# Patient Record
Sex: FEMALE | Race: WHITE | NOT HISPANIC OR LATINO | Employment: OTHER | URBAN - METROPOLITAN AREA
[De-identification: names, ages, dates, MRNs, and addresses within clinical notes are randomized per-mention and may not be internally consistent; named-entity substitution may affect disease eponyms.]

---

## 2017-10-02 ENCOUNTER — CATARACT CONSULT (OUTPATIENT)
Dept: URBAN - METROPOLITAN AREA CLINIC 39 | Facility: CLINIC | Age: 82
End: 2017-10-02

## 2017-10-02 VITALS
SYSTOLIC BLOOD PRESSURE: 122 MMHG | DIASTOLIC BLOOD PRESSURE: 67 MMHG | HEIGHT: 55 IN | RESPIRATION RATE: 15 BRPM | HEART RATE: 71 BPM

## 2017-10-02 DIAGNOSIS — H18.59: ICD-10-CM

## 2017-10-02 DIAGNOSIS — H25.811: ICD-10-CM

## 2017-10-02 DIAGNOSIS — H18.51: ICD-10-CM

## 2017-10-02 DIAGNOSIS — H40.033: ICD-10-CM

## 2017-10-02 DIAGNOSIS — H04.123: ICD-10-CM

## 2017-10-02 DIAGNOSIS — H25.812: ICD-10-CM

## 2017-10-02 PROCEDURE — 76514 ECHO EXAM OF EYE THICKNESS: CPT

## 2017-10-02 PROCEDURE — 92136TC INTERFEROMETRY - TECHNICAL COMPONENT

## 2017-10-02 PROCEDURE — 4040F PNEUMOC VAC/ADMIN/RCVD: CPT

## 2017-10-02 PROCEDURE — G8952 PRE-HTN/HTN, NO F/U, NOT GVN: HCPCS

## 2017-10-02 PROCEDURE — G8427 DOCREV CUR MEDS BY ELIG CLIN: HCPCS

## 2017-10-02 PROCEDURE — 99204 OFFICE O/P NEW MOD 45 MIN: CPT

## 2017-10-02 PROCEDURE — 1036F TOBACCO NON-USER: CPT

## 2017-10-02 PROCEDURE — 92132 CPTRZD OPH DX IMG ANT SGM: CPT

## 2017-10-02 RX ORDER — MOXIFLOXACIN HYDROCHLORIDE 5 MG/ML: 1 SOLUTION/ DROPS OPHTHALMIC

## 2017-10-02 RX ORDER — DUREZOL 0.5 MG/ML: 1 EMULSION OPHTHALMIC TWICE A DAY

## 2017-10-02 ASSESSMENT — VISUAL ACUITY
OS_BAT: 20/400
OD_PAM: 20/25-1
OD_CC: J6
OS_AM: 20/25-1
OD_BAT: 20/200
OD_PH: 20/60
OS_SC: 20/40-1
OD_SC: 20/70
OD_SC: J10
OS_SC: J12
OS_CC: J6

## 2017-10-02 ASSESSMENT — TONOMETRY
OS_IOP_MMHG: 13
OD_IOP_MMHG: 13

## 2017-10-10 ENCOUNTER — SURGERY/PROCEDURE (OUTPATIENT)
Dept: URBAN - METROPOLITAN AREA CLINIC 39 | Facility: CLINIC | Age: 82
End: 2017-10-10

## 2017-10-10 DIAGNOSIS — H25.811: ICD-10-CM

## 2017-10-10 PROCEDURE — 6698454 REMOVE CATARACT;INSERT LENS (SX ONLY)

## 2017-10-11 ENCOUNTER — CATARACT POST-OP 1-DAY (OUTPATIENT)
Age: 82
End: 2017-10-11

## 2017-10-11 DIAGNOSIS — H18.51: ICD-10-CM

## 2017-10-11 DIAGNOSIS — H04.123: ICD-10-CM

## 2017-10-11 DIAGNOSIS — H40.033: ICD-10-CM

## 2017-10-11 DIAGNOSIS — Z98.890: ICD-10-CM

## 2017-10-11 DIAGNOSIS — H18.59: ICD-10-CM

## 2017-10-11 DIAGNOSIS — H25.812: ICD-10-CM

## 2017-10-11 PROCEDURE — G8785 BP SCRN NO PERF AT INTERVAL: HCPCS

## 2017-10-11 PROCEDURE — 4040F PNEUMOC VAC/ADMIN/RCVD: CPT

## 2017-10-11 PROCEDURE — 1036F TOBACCO NON-USER: CPT

## 2017-10-11 PROCEDURE — 99024 POSTOP FOLLOW-UP VISIT: CPT

## 2017-10-11 PROCEDURE — G8427 DOCREV CUR MEDS BY ELIG CLIN: HCPCS

## 2017-10-11 ASSESSMENT — TONOMETRY
OS_IOP_MMHG: 14
OD_IOP_MMHG: 15

## 2017-10-11 ASSESSMENT — VISUAL ACUITY: OS_SC: 20/400

## 2017-10-14 ENCOUNTER — POST-OP CATARACT (OUTPATIENT)
Age: 82
End: 2017-10-14

## 2017-10-14 DIAGNOSIS — Z98.890: ICD-10-CM

## 2017-10-14 PROCEDURE — 99024 POSTOP FOLLOW-UP VISIT: CPT

## 2017-10-14 ASSESSMENT — VISUAL ACUITY
OD_SC: 20/70
OS_SC: 20/70

## 2017-10-14 ASSESSMENT — TONOMETRY
OS_IOP_MMHG: 13
OD_IOP_MMHG: 13

## 2017-10-20 ENCOUNTER — POST-OP CATARACT (OUTPATIENT)
Age: 82
End: 2017-10-20

## 2017-10-20 DIAGNOSIS — Z98.890: ICD-10-CM

## 2017-10-20 PROCEDURE — 99024 POSTOP FOLLOW-UP VISIT: CPT

## 2017-10-20 ASSESSMENT — VISUAL ACUITY
OS_SC: 20/100
OD_SC: 20/70-2

## 2017-10-20 ASSESSMENT — TONOMETRY
OD_IOP_MMHG: 14
OS_IOP_MMHG: 12

## 2017-10-31 ENCOUNTER — SURGERY/PROCEDURE (OUTPATIENT)
Dept: URBAN - METROPOLITAN AREA CLINIC 39 | Facility: CLINIC | Age: 82
End: 2017-10-31

## 2017-10-31 DIAGNOSIS — H25.812: ICD-10-CM

## 2017-10-31 PROCEDURE — 66984 XCAPSL CTRC RMVL W/O ECP: CPT

## 2017-11-01 ENCOUNTER — CATARACT POST-OP 1-DAY (OUTPATIENT)
Age: 82
End: 2017-11-01

## 2017-11-01 DIAGNOSIS — Z96.1: ICD-10-CM

## 2017-11-01 PROCEDURE — 99024 POSTOP FOLLOW-UP VISIT: CPT

## 2017-11-01 ASSESSMENT — VISUAL ACUITY
OS_SC: 20/200
OD_SC: 20/70
OS_PH: 20/50+2
OD_PH: 20/100

## 2017-11-01 ASSESSMENT — TONOMETRY
OD_IOP_MMHG: 10
OS_IOP_MMHG: 12

## 2017-11-15 ENCOUNTER — CATARACT POST-OP 1-DAY (OUTPATIENT)
Age: 82
End: 2017-11-15

## 2017-11-15 DIAGNOSIS — Z96.1: ICD-10-CM

## 2017-11-15 PROCEDURE — 99024 POSTOP FOLLOW-UP VISIT: CPT

## 2017-11-15 ASSESSMENT — VISUAL ACUITY
OS_SC: 20/60-2
OD_SC: 20/100+1

## 2017-12-08 ENCOUNTER — POST-OP (OUTPATIENT)
Age: 82
End: 2017-12-08

## 2017-12-08 DIAGNOSIS — Z96.1: ICD-10-CM

## 2017-12-08 PROCEDURE — 99024 POSTOP FOLLOW-UP VISIT: CPT

## 2017-12-08 ASSESSMENT — VISUAL ACUITY
OU_SC: 20/40-1
OD_SC: J6
OD_SC: 20/70
OS_SC: J6
OS_SC: 20/50

## 2017-12-08 ASSESSMENT — TONOMETRY
OD_IOP_MMHG: 10
OS_IOP_MMHG: 13

## 2018-01-15 NOTE — PATIENT DISCUSSION
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 Denies hx of migraines, heavy sneezing, vigorous coughing or recent trauma. No defects 360 degrees OU. - Plan : Monitor for changes. Advised patient to call our office with decreased vision or an increase in flashes and/or floaters. RV PRN, sooner if symptoms worsen or vision changes.

## 2021-12-14 ENCOUNTER — EMERGENCY VISIT (OUTPATIENT)
Dept: URBAN - METROPOLITAN AREA CLINIC 39 | Facility: CLINIC | Age: 86
End: 2021-12-14

## 2021-12-14 DIAGNOSIS — H02.88A: ICD-10-CM

## 2021-12-14 DIAGNOSIS — H11.32: ICD-10-CM

## 2021-12-14 DIAGNOSIS — H04.123: ICD-10-CM

## 2021-12-14 DIAGNOSIS — H02.88B: ICD-10-CM

## 2021-12-14 PROCEDURE — 92012 INTRM OPH EXAM EST PATIENT: CPT

## 2021-12-14 ASSESSMENT — VISUAL ACUITY
OD_CC: 20/40
OD_CC: J2
OS_CC: 20/30-2
OS_CC: J1

## 2021-12-14 ASSESSMENT — TONOMETRY
OD_IOP_MMHG: 10
OS_IOP_MMHG: 11